# Patient Record
Sex: MALE | Race: WHITE
[De-identification: names, ages, dates, MRNs, and addresses within clinical notes are randomized per-mention and may not be internally consistent; named-entity substitution may affect disease eponyms.]

---

## 2018-09-17 ENCOUNTER — HOSPITAL ENCOUNTER (INPATIENT)
Dept: HOSPITAL 74 - YASAS | Age: 53
LOS: 4 days | Discharge: HOME | DRG: 773 | End: 2018-09-21
Attending: INTERNAL MEDICINE | Admitting: INTERNAL MEDICINE
Payer: COMMERCIAL

## 2018-09-17 VITALS — BODY MASS INDEX: 21.4 KG/M2

## 2018-09-17 DIAGNOSIS — F11.23: Primary | ICD-10-CM

## 2018-09-17 DIAGNOSIS — F14.20: ICD-10-CM

## 2018-09-17 DIAGNOSIS — F10.230: ICD-10-CM

## 2018-09-17 DIAGNOSIS — F19.24: ICD-10-CM

## 2018-09-17 DIAGNOSIS — F13.230: ICD-10-CM

## 2018-09-17 DIAGNOSIS — F19.282: ICD-10-CM

## 2018-09-17 DIAGNOSIS — Z94.5: ICD-10-CM

## 2018-09-17 DIAGNOSIS — F41.8: ICD-10-CM

## 2018-09-17 DIAGNOSIS — L98.9: ICD-10-CM

## 2018-09-17 DIAGNOSIS — M43.9: ICD-10-CM

## 2018-09-17 PROCEDURE — HZ2ZZZZ DETOXIFICATION SERVICES FOR SUBSTANCE ABUSE TREATMENT: ICD-10-PCS | Performed by: INTERNAL MEDICINE

## 2018-09-17 RX ADMIN — BACITRACIN ZINC SCH GM: 500 OINTMENT TOPICAL at 22:37

## 2018-09-17 RX ADMIN — MOMETASONE FUROATE SCH PUFF: 220 INHALANT RESPIRATORY (INHALATION) at 22:38

## 2018-09-17 RX ADMIN — Medication SCH MG: at 22:37
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
## 2018-09-18 LAB
ALBUMIN SERPL-MCNC: 3.7 G/DL (ref 3.4–5)
ALP SERPL-CCNC: 74 U/L (ref 45–117)
ALT SERPL-CCNC: 37 U/L (ref 13–61)
ANION GAP SERPL CALC-SCNC: 11 MMOL/L (ref 8–16)
AST SERPL-CCNC: 55 U/L (ref 15–37)
BILIRUB SERPL-MCNC: 0.3 MG/DL (ref 0.2–1)
BUN SERPL-MCNC: 21 MG/DL (ref 7–18)
CALCIUM SERPL-MCNC: 8.9 MG/DL (ref 8.5–10.1)
CHLORIDE SERPL-SCNC: 99 MMOL/L (ref 98–107)
CO2 SERPL-SCNC: 30 MMOL/L (ref 21–32)
CREAT SERPL-MCNC: 1.4 MG/DL (ref 0.55–1.3)
DEPRECATED RDW RBC AUTO: 15.5 % (ref 11.9–15.9)
GLUCOSE SERPL-MCNC: 99 MG/DL (ref 74–106)
HCT VFR BLD CALC: 38.2 % (ref 35.4–49)
HGB BLD-MCNC: 12.4 GM/DL (ref 11.7–16.9)
MCH RBC QN AUTO: 29.3 PG (ref 25.7–33.7)
MCHC RBC AUTO-ENTMCNC: 32.5 G/DL (ref 32–35.9)
MCV RBC: 90.3 FL (ref 80–96)
PLATELET # BLD AUTO: 480 K/MM3 (ref 134–434)
PMV BLD: 8 FL (ref 7.5–11.1)
POTASSIUM SERPLBLD-SCNC: 4.4 MMOL/L (ref 3.5–5.1)
PROT SERPL-MCNC: 6.9 G/DL (ref 6.4–8.2)
RBC # BLD AUTO: 4.23 M/MM3 (ref 4–5.6)
SODIUM SERPL-SCNC: 140 MMOL/L (ref 136–145)
WBC # BLD AUTO: 10.2 K/MM3 (ref 4–10)

## 2018-09-18 RX ADMIN — CYCLOBENZAPRINE HYDROCHLORIDE PRN MG: 10 TABLET, FILM COATED ORAL at 10:41

## 2018-09-18 RX ADMIN — Medication PRN MG: at 22:10

## 2018-09-18 RX ADMIN — NICOTINE SCH MG: 21 PATCH TRANSDERMAL at 10:41

## 2018-09-18 RX ADMIN — BACITRACIN ZINC SCH GM: 500 OINTMENT TOPICAL at 22:09

## 2018-09-18 RX ADMIN — BACITRACIN ZINC SCH GM: 500 OINTMENT TOPICAL at 10:40

## 2018-09-18 RX ADMIN — MOMETASONE FUROATE SCH PUFF: 220 INHALANT RESPIRATORY (INHALATION) at 22:10

## 2018-09-18 RX ADMIN — Medication SCH TAB: at 10:41

## 2018-09-18 RX ADMIN — Medication SCH MG: at 22:09

## 2018-09-18 NOTE — PN
[facility] CIWA





- CIWA Score


Nausea/Vomitin-Mild Nausea/No Vomiting


Muscle Tremors: 3


Anxiety: 3


Agitation: 3


Paroxysmal Sweats: 1-Minimal Palms Moist


Orientation: 0-Oriented


Tacttile Disturbances: 0-None


Auditory Disturbances: 1-Very Mild


Visual Disturbances: 0-None


Headache: 1-Very Mild


CIWA-Ar Total Score: 13





[facility] COWS





- Scale


Resting Pulse: 0= AR 80 or Below


Sweatin= Chills/Flushing


Restless Observation: 1= Difficult to Sit Still


Pupil Size: 0= Normal to Room Light


Bone or Joint Aches: 2= Severe Diffuse Aches


Runny Nose/ Eye Tearin= Nasal Congestion


GI Upset > 30mins: 2= Nausea/Diarrhea


Tremor Observation of Outstretched Hands: 2= Slight Tremor Visible


Yawning Observation: 2= >3x During Session


Anxiety or Irritability: 2=Irritable/Anxious


Goose Flesh Skin: 0=Smooth Skin


COWS Score: 13





BHS Progress Note (SOAP)


Subjective: 





body ache joints pain sweat tremor anxiety restlessness


Objective: 





18 16:28


 Vital Signs











Temperature  98.2 F   18 13:56


 


Pulse Rate  56 L  18 13:56


 


Respiratory Rate  18   18 13:56


 


Blood Pressure  111/75   18 13:56


 


O2 Sat by Pulse Oximetry (%)      








 Laboratory Last Values











WBC  10.2 K/mm3 (4.0-10.0)  H  18  07:00    


 


RBC  4.23 M/mm3 (4.00-5.60)   18  07:00    


 


Hgb  12.4 GM/dL (11.7-16.9)   18  07:00    


 


Hct  38.2 % (35.4-49)   18  07:00    


 


MCV  90.3 fl (80-96)   18  07:00    


 


MCH  29.3 pg (25.7-33.7)   18  07:00    


 


MCHC  32.5 g/dl (32.0-35.9)   18  07:00    


 


RDW  15.5 % (11.9-15.9)   18  07:00    


 


Plt Count  480 K/MM3 (134-434)  H  18  07:00    


 


MPV  8.0 fl (7.5-11.1)   18  07:00    


 


Sodium  140 mmol/L (136-145)   18  07:00    


 


Potassium  4.4 mmol/L (3.5-5.1)   18  07:00    


 


Chloride  99 mmol/L ()   18  07:00    


 


Carbon Dioxide  30 mmol/L (21-32)   18  07:00    


 


Anion Gap  11 MMOL/L (8-16)   18  07:00    


 


BUN  21 mg/dL (7-18)  H  18  07:00    


 


Creatinine  1.4 mg/dL (0.55-1.3)  H  18  07:00    


 


Creat Clearance w eGFR  52.81  (>60)   18  07:00    


 


Random Glucose  99 mg/dL ()   18  07:00    


 


Calcium  8.9 mg/dL (8.5-10.1)   18  07:00    


 


Total Bilirubin  0.3 mg/dL (0.2-1)   18  07:00    


 


AST  55 U/L (15-37)  H  18  07:00    


 


ALT  37 U/L (13-61)   18  07:00    


 


Alkaline Phosphatase  74 U/L ()   18  07:00    


 


Total Protein  6.9 g/dl (6.4-8.2)   18  07:00    


 


Albumin  3.7 g/dl (3.4-5.0)   18  07:00    


 


RPR Titer  Nonreactive  (NONREACTIVE)   18  07:00    


 


HIV 1&2 Antibody Screen  Negative   18  10:20    


 


HIV P24 Antigen  Negative   18  10:20    








lab noted


Assessment: 





18 16:28


withdrawal sx


Plan: 





continue detox

## 2018-09-18 NOTE — CONSULT
[facility] Psychiatric Consult





- Data


Date of interview: 09/18/18


Admission source: [facility]


Identifying data: Patient is a 52 year old  male, domiciled, and is 

supported by his wife. This is patient's first admission to detox at [facility]. Pt. admitted to  for alcohol, opiate, cocaine, and benzodiazepine 

dependence.


Substance Abuse History: Smoking Cessation.  Smoking history: Current every day 

smoker.  Have you smoked in the past 12 months: Yes.  Aproximately how many 

cigarettes per day: 20.  Hx Chewing Tobacco Use: No.  Initiated information on 

smoking cessation: Yes.  - Substances Abused.  ** Heroin.  Route: Inhalation.  

Frequency: Daily.  Amount used: 1 gm.  Age of first use: 15.  Date of Last Use: 

09/16/18.  ** Xanax.  Route: Oral.  Frequency: Daily.  Amount used: 4 mg.  Age 

of first use: 14.  Date of Last Use: 09/15/18.  ** Alcohol-scotch.  Route: 

Oral.  Frequency: 1-2 times per week.  Amount used: 1-2 pts.  Age of first use: 

11.  Date of Last Use: 09/16/18


Medical History: Asthma, Hep C, fx, upper back/left tibia in 2016


Psychiatric History: Patient denies h/o psychiatric hospitalization, outpatient 

care, and suicide attempt.


Physical/Sexual Abuse/Trauma History: denies.





Mental Status Exam





- Mental Status Exam


Alert and Oriented to: Time, Place, Person


Cognitive Function: Good


Patient Appearance: Well Groomed


Mood: Withdrawn, Euthymic


Affect: Mood Congruent


Patient Behavior: Fatigued


Speech Pattern: Delayed


Voice Loudness: Moderately Soft/Quiet


Thought Process: Goal Oriented


Thought Disorder: Not Present


Hallucinations: Denies


Suicidal Ideation: Denies


Homicidal Ideation: Denies


Insight/Judgement: Poor


Sleep: Fair


Appetite: Fair


Muscle strength/Tone: Normal


Gait/Station: Normal





Psychiatric Findings





- Problem List (Axis 1, 2,3)


(1) Cocaine dependence


Current Visit: Yes   Status: Acute   





(2) Alcohol dependence with uncomplicated withdrawal


Current Visit: Yes   Status: Acute   





(3) Opioid dependence with withdrawal


Current Visit: Yes   Status: Acute   





(4) Sedative, hypnotic or anxiolytic dependence with withdrawal, uncomplicated


Current Visit: Yes   Status: Acute   





(5) Substance induced mood disorder


Current Visit: Yes   Status: Suspected   





- Initial Treatment Plan


Initial Treatment Plan: Psychoeducation provided. Detoxification in progress. 

Observation.

## 2018-09-18 NOTE — EKG
Test Reason : 

Blood Pressure : ***/*** mmHG

Vent. Rate : 069 BPM     Atrial Rate : 069 BPM

   P-R Int : 148 ms          QRS Dur : 080 ms

    QT Int : 386 ms       P-R-T Axes : 067 076 064 degrees

   QTc Int : 413 ms

 

*** POOR DATA QUALITY, INTERPRETATION MAY BE ADVERSELY AFFECTED

NORMAL SINUS RHYTHM

NORMAL ECG

NO PREVIOUS ECGS AVAILABLE

Confirmed by Garrett Dockery ([id number]) on 9/18/2018 4:45:18 PM

 

Referred By:             Confirmed By:Garrett Dockery

## 2018-09-19 RX ADMIN — NICOTINE SCH MG: 21 PATCH TRANSDERMAL at 10:14

## 2018-09-19 RX ADMIN — MOMETASONE FUROATE SCH PUFF: 220 INHALANT RESPIRATORY (INHALATION) at 22:20

## 2018-09-19 RX ADMIN — METHADONE HYDROCHLORIDE SCH MG: 5 TABLET ORAL at 10:15

## 2018-09-19 RX ADMIN — Medication SCH MG: at 22:17

## 2018-09-19 RX ADMIN — Medication SCH TAB: at 10:14

## 2018-09-20 LAB
APPEARANCE UR: CLEAR
BILIRUB UR STRIP.AUTO-MCNC: NEGATIVE MG/DL
COLOR UR: YELLOW
KETONES UR QL STRIP: NEGATIVE
LEUKOCYTE ESTERASE UR QL STRIP.AUTO: NEGATIVE
NITRITE UR QL STRIP: NEGATIVE
PH UR: 7 [PH] (ref 5–8)
PROT UR QL STRIP: NEGATIVE
PROT UR QL STRIP: NEGATIVE
SP GR UR: 1.01 (ref 1–1.03)
UROBILINOGEN UR STRIP-MCNC: NEGATIVE MG/DL (ref 0.2–1)

## 2018-09-20 RX ADMIN — CYCLOBENZAPRINE HYDROCHLORIDE PRN MG: 10 TABLET, FILM COATED ORAL at 17:27

## 2018-09-20 RX ADMIN — Medication SCH TAB: at 10:12

## 2018-09-20 RX ADMIN — METHADONE HYDROCHLORIDE SCH MG: 5 TABLET ORAL at 10:11

## 2018-09-20 RX ADMIN — Medication PRN MG: at 22:23

## 2018-09-20 RX ADMIN — Medication SCH MG: at 22:23

## 2018-09-20 RX ADMIN — MOMETASONE FUROATE SCH PUFF: 220 INHALANT RESPIRATORY (INHALATION) at 22:23

## 2018-09-20 RX ADMIN — CYCLOBENZAPRINE HYDROCHLORIDE PRN MG: 10 TABLET, FILM COATED ORAL at 11:13

## 2018-09-20 RX ADMIN — NICOTINE SCH MG: 21 PATCH TRANSDERMAL at 10:12

## 2018-09-20 NOTE — PN
[facility] Progress Note (SOAP)


Subjective: 





feeling better sleep better at night no tremor no sweat no gi distress


Objective: 





09/20/18 16:47


 Vital Signs











Temperature  97.7 F   09/20/18 13:43


 


Pulse Rate  75   09/20/18 13:43


 


Respiratory Rate  16   09/20/18 13:43


 


Blood Pressure  117/54   09/20/18 13:43


 


O2 Sat by Pulse Oximetry (%)      








 Laboratory Last Values











WBC  10.2 K/mm3 (4.0-10.0)  H  09/18/18  07:00    


 


RBC  4.23 M/mm3 (4.00-5.60)   09/18/18  07:00    


 


Hgb  12.4 GM/dL (11.7-16.9)   09/18/18  07:00    


 


Hct  38.2 % (35.4-49)   09/18/18  07:00    


 


MCV  90.3 fl (80-96)   09/18/18  07:00    


 


MCH  29.3 pg (25.7-33.7)   09/18/18  07:00    


 


MCHC  32.5 g/dl (32.0-35.9)   09/18/18  07:00    


 


RDW  15.5 % (11.9-15.9)   09/18/18  07:00    


 


Plt Count  480 K/MM3 (134-434)  H  09/18/18  07:00    


 


MPV  8.0 fl (7.5-11.1)   09/18/18  07:00    


 


Sodium  140 mmol/L (136-145)   09/18/18  07:00    


 


Potassium  4.4 mmol/L (3.5-5.1)   09/18/18  07:00    


 


Chloride  99 mmol/L ()   09/18/18  07:00    


 


Carbon Dioxide  30 mmol/L (21-32)   09/18/18  07:00    


 


Anion Gap  11 MMOL/L (8-16)   09/18/18  07:00    


 


BUN  21 mg/dL (7-18)  H  09/18/18  07:00    


 


Creatinine  1.4 mg/dL (0.55-1.3)  H  09/18/18  07:00    


 


Creat Clearance w eGFR  52.81  (>60)   09/18/18  07:00    


 


Random Glucose  99 mg/dL ()   09/18/18  07:00    


 


Calcium  8.9 mg/dL (8.5-10.1)   09/18/18  07:00    


 


Total Bilirubin  0.3 mg/dL (0.2-1)   09/18/18  07:00    


 


AST  55 U/L (15-37)  H  09/18/18  07:00    


 


ALT  37 U/L (13-61)   09/18/18  07:00    


 


Alkaline Phosphatase  74 U/L ()   09/18/18  07:00    


 


Total Protein  6.9 g/dl (6.4-8.2)   09/18/18  07:00    


 


Albumin  3.7 g/dl (3.4-5.0)   09/18/18  07:00    


 


Urine Color  Yellow   09/20/18  07:00    


 


Urine Appearance  Clear   09/20/18  07:00    


 


Urine pH  7.0  (5.0-8.0)   09/20/18  07:00    


 


Ur Specific Gravity  1.014  (1.001-1.035)   09/20/18  07:00    


 


Urine Protein  Negative  (NEGATIVE)   09/20/18  07:00    


 


Urine Glucose (UA)  Negative  (NEGATIVE)   09/20/18  07:00    


 


Urine Ketones  Negative  (NEGATIVE)   09/20/18  07:00    


 


Urine Blood  Negative  (NEGATIVE)   09/20/18  07:00    


 


Urine Nitrite  Negative  (NEGATIVE)   09/20/18  07:00    


 


Urine Bilirubin  Negative  (<2.0 mg/dL)   09/20/18  07:00    


 


Urine Urobilinogen  Negative mg/dL (0.2-1.0)   09/20/18  07:00    


 


Ur Leukocyte Esterase  Negative  (NEGATIVE)   09/20/18  07:00    


 


RPR Titer  Nonreactive  (NONREACTIVE)   09/18/18  07:00    


 


HIV 1&2 Antibody Screen  Negative   09/17/18  10:20    


 


HIV P24 Antigen  Negative   09/17/18  10:20    








lab noted


Assessment: 





09/20/18 16:48


mild withdrawal sx


Plan: 





medically supervised detox

## 2018-09-21 VITALS — HEART RATE: 73 BPM | SYSTOLIC BLOOD PRESSURE: 108 MMHG | DIASTOLIC BLOOD PRESSURE: 50 MMHG

## 2018-09-21 VITALS — TEMPERATURE: 98.2 F

## 2018-09-21 RX ADMIN — Medication SCH TAB: at 10:14

## 2018-09-21 RX ADMIN — NICOTINE SCH MG: 21 PATCH TRANSDERMAL at 10:14

## 2018-09-21 NOTE — DS
[facility] Detox Discharge Summary


Admission Date: 


09/17/18





Discharge Date: 09/21/18





- History


Present History: Alcohol Dependence, Opioid Dependence


Additional Comments: 





PATIENT REQUESTED D/C TODAY INSTEAD OF TOMORROW. DENIES SI/HI. MEDICALLY 

STABLE. TO FOLLOW UP WITH [facility] OUTPATIENT PROGRAM FOR ONGOING TREATMENT AND TO 

ATTEND GROUP MEETINGS TO PREVENT RELAPSE. DISCHARGE INSTRUCTIONS PROVIDED. 





- Physical Exam Results


Vital Signs: 


 Vital Signs











Temperature  98.2 F   09/21/18 09:23


 


Pulse Rate  73   09/21/18 09:23


 


Respiratory Rate  16   09/21/18 09:23


 


Blood Pressure  108/50 L  09/21/18 09:23


 


O2 Sat by Pulse Oximetry (%)      














- Treatment


Hospital Course: Detox Protocol Followed, Detoxed Safely, Responded well, 

Discharged Condition Good


Patient has Accepted a Rehab Referral to: [facility]





- Medication


Discharge Medications: 


Ambulatory Orders





Mirtazapine [Remeron -] 45 mg PO HS 09/17/18 


Patient's Own Medication [Patient's Own Med (Nf) -] 1 puff IN BID 09/17/18 


Albuterol Sulfate Inhaler - [Ventolin HFA Inhaler -] 2 inh PO Q4H PRN #1 

inhaler 09/20/18 


traZODone HCL [Desyrel -] 50 mg PO HS #30 tablet 09/21/18 











- Diagnosis


(1) Withdrawal syndrome


Current Visit: Yes   Status: Resolved   


Qualifiers: 


   Substance type: opioid   Qualified Code(s): F11.23 - Opioid dependence with 

withdrawal   





- AMA


Did Patient Leave Against Medical Advice: No
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
## 2018-09-21 NOTE — PN
[facility] Progress Note


Note: 





PATIENT ON DETOX PROTOCOL FOR ETOH/OPIOD/BZO WITHDRAWAL. 


 Laboratory Tests











  09/17/18 09/18/18 09/18/18





  10:20 07:00 07:00


 


WBC   10.2 H 


 


RBC   4.23 


 


Hgb   12.4 


 


Hct   38.2 


 


MCV   90.3 


 


MCH   29.3 


 


MCHC   32.5 


 


RDW   15.5 


 


Plt Count   480 H 


 


MPV   8.0 


 


Sodium    140


 


Potassium    4.4


 


Chloride    99


 


Carbon Dioxide    30


 


Anion Gap    11


 


BUN    21 H


 


Creatinine    1.4 H


 


Creat Clearance w eGFR    52.81


 


Random Glucose    99


 


Calcium    8.9


 


Total Bilirubin    0.3


 


AST    55 H


 


ALT    37


 


Alkaline Phosphatase    74


 


Total Protein    6.9


 


Albumin    3.7


 


Urine Color   


 


Urine Appearance   


 


Urine pH   


 


Ur Specific Gravity   


 


Urine Protein   


 


Urine Glucose (UA)   


 


Urine Ketones   


 


Urine Blood   


 


Urine Nitrite   


 


Urine Bilirubin   


 


Urine Urobilinogen   


 


Ur Leukocyte Esterase   


 


RPR Titer   


 


HIV 1&2 Antibody Screen  Negative  


 


HIV P24 Antigen  Negative  














  09/18/18 09/20/18





  07:00 07:00


 


WBC  


 


RBC  


 


Hgb  


 


Hct  


 


MCV  


 


MCH  


 


MCHC  


 


RDW  


 


Plt Count  


 


MPV  


 


Sodium  


 


Potassium  


 


Chloride  


 


Carbon Dioxide  


 


Anion Gap  


 


BUN  


 


Creatinine  


 


Creat Clearance w eGFR  


 


Random Glucose  


 


Calcium  


 


Total Bilirubin  


 


AST  


 


ALT  


 


Alkaline Phosphatase  


 


Total Protein  


 


Albumin  


 


Urine Color   Yellow


 


Urine Appearance   Clear


 


Urine pH   7.0


 


Ur Specific Gravity   1.014


 


Urine Protein   Negative


 


Urine Glucose (UA)   Negative


 


Urine Ketones   Negative


 


Urine Blood   Negative


 


Urine Nitrite   Negative


 


Urine Bilirubin   Negative


 


Urine Urobilinogen   Negative


 


Ur Leukocyte Esterase   Negative


 


RPR Titer  Nonreactive 


 


HIV 1&2 Antibody Screen  


 


HIV P24 Antigen  








 Vital Signs











Temperature  98.2 F   09/21/18 09:23


 


Pulse Rate  73   09/21/18 09:23


 


Respiratory Rate  16   09/21/18 09:23


 


Blood Pressure  108/50 L  09/21/18 09:23


 


O2 Sat by Pulse Oximetry (%)      








ALERT AND ORIENTED


SKIN WARM AND DRY


CAR S1S2


RESP CTA BL


PSYCH ANXIOUS AND IRRITABLE REGARDING PENDING DISCHARGE





A/P:





WITHDRAWAL SYNDROME








PATIENT MEDICALLY STABLE


CONTINUE ORAL FLUIDS


CONTINUE DETOX AS PER PROTOCOL

## 2022-05-21 NOTE — PN
[facility] CIWA





- CIWA Score


Nausea/Vomitin-Mild Nausea/No Vomiting


Muscle Tremors: 3


Anxiety: 4-Mod. Anxious/Guarded


Agitation: 4-Moderately Restless


Paroxysmal Sweats: 3 (Facial perspiration)


Orientation: 0-Oriented


Tacttile Disturbances: 0-None


Auditory Disturbances: 0-None


Visual Disturbances: 0-None


Headache: 0-None Present


CIWA-Ar Total Score: 15





[facility] COWS





- Scale


Resting Pulse: 0= OK 80 or Below


Sweatin=Flushed/Facial Moisture (Facial perspiration)


Restless Observation: 1= Difficult to Sit Still


Pupil Size: 0= Normal to Room Light


Bone or Joint Aches: 1= Mild Discomfort


Runny Nose/ Eye Tearin= None


GI Upset > 30mins: 2= Nausea/Diarrhea (Denies diarrhea)


Tremor Observation of Outstretched Hands: 2= Slight Tremor Visible


Yawning Observation: 0= None


Anxiety or Irritability: 1=Feels Anxious/Irritable


Goose Flesh Skin: 0=Smooth Skin


COWS Score: 9





[facility] Progress Note (SOAP)


Subjective: 





States feeling very nauseous and anxious. Feeling weak. States doesn't want 

bacitracin for wound- needs a special cream that is ordered on-line. Prefers 

just a dry dressing to (R) skin graft and lesion site. 


Objective: 


A&O x3. Has perspiration of face but not profuse. Tremors of hands noted. Abd S/

NT/BS+.  No drainage from (R) skin graft site. Pedal pulses (+)





 Vital Signs











  18





  06:00 09:48


 


Temperature 97.8 F 97.7 F


 


Pulse Rate 76 71


 


Respiratory 20 16





Rate  


 


Blood Pressure 162/86 113/76











 Lab Results











WBC  10.2 K/mm3 (4.0-10.0)  H  18  07:00    


 


RBC  4.23 M/mm3 (4.00-5.60)   18  07:00    


 


Hgb  12.4 GM/dL (11.7-16.9)   18  07:00    


 


Hct  38.2 % (35.4-49)   18  07:00    


 


MCV  90.3 fl (80-96)   18  07:00    


 


MCHC  32.5 g/dl (32.0-35.9)   18  07:00    


 


RDW  15.5 % (11.9-15.9)   18  07:00    


 


Plt Count  480 K/MM3 (134-434)  H  18  07:00    


 


Sodium  140 mmol/L (136-145)   18  07:00    


 


Potassium  4.4 mmol/L (3.5-5.1)   18  07:00    


 


Chloride  99 mmol/L ()   18  07:00    


 


Carbon Dioxide  30 mmol/L (21-32)   18  07:00    


 


Anion Gap  11 MMOL/L (8-16)   18  07:00    


 


BUN  21 mg/dL (7-18)  H  18  07:00    


 


Creatinine  1.4 mg/dL (0.55-1.3)  H  18  07:00    


 


Random Glucose  99 mg/dL ()   18  07:00    


 


Calcium  8.9 mg/dL (8.5-10.1)   18  07:00    








Labs reviewed.











Assessment: 


Withdrawal symptoms.








Plan: 


Continue detox.


D/c bacitracin


Dry dressings to (R) graft site wound[facility]les[facility]